# Patient Record
Sex: MALE | Race: NATIVE HAWAIIAN OR OTHER PACIFIC ISLANDER
[De-identification: names, ages, dates, MRNs, and addresses within clinical notes are randomized per-mention and may not be internally consistent; named-entity substitution may affect disease eponyms.]

---

## 2017-09-16 ENCOUNTER — HOSPITAL ENCOUNTER (EMERGENCY)
Dept: HOSPITAL 5 - ED | Age: 47
Discharge: LEFT BEFORE DISCHARGE | End: 2017-09-16
Payer: SELF-PAY

## 2017-09-16 VITALS — DIASTOLIC BLOOD PRESSURE: 105 MMHG | SYSTOLIC BLOOD PRESSURE: 147 MMHG
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
## 2017-09-16 DIAGNOSIS — Z53.21: ICD-10-CM

## 2017-09-16 DIAGNOSIS — S09.90XA: Primary | ICD-10-CM
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
## 2018-09-19 ENCOUNTER — HOSPITAL ENCOUNTER (EMERGENCY)
Dept: HOSPITAL 5 - ED | Age: 48
Discharge: HOME | End: 2018-09-19
Payer: SELF-PAY

## 2018-09-19 VITALS — SYSTOLIC BLOOD PRESSURE: 150 MMHG | DIASTOLIC BLOOD PRESSURE: 99 MMHG

## 2018-09-19 DIAGNOSIS — R07.89: Primary | ICD-10-CM

## 2018-09-19 DIAGNOSIS — F41.1: ICD-10-CM

## 2018-09-19 DIAGNOSIS — R20.0: ICD-10-CM

## 2018-09-19 DIAGNOSIS — F14.10: ICD-10-CM

## 2018-09-19 DIAGNOSIS — F17.200: ICD-10-CM

## 2018-09-19 LAB
BASOPHILS # (AUTO): 0 K/MM3 (ref 0–0.1)
BASOPHILS NFR BLD AUTO: 0.3 % (ref 0–1.8)
BUN SERPL-MCNC: 9 MG/DL (ref 9–20)
BUN/CREAT SERPL: 10 %
CALCIUM SERPL-MCNC: 8.9 MG/DL (ref 8.4–10.2)
EOSINOPHIL # BLD AUTO: 0.1 K/MM3 (ref 0–0.4)
EOSINOPHIL NFR BLD AUTO: 1.3 % (ref 0–4.3)
HCT VFR BLD CALC: 43.3 % (ref 35.5–45.6)
HEMOLYSIS INDEX: 6
HGB BLD-MCNC: 14.7 GM/DL (ref 11.8–15.2)
LYMPHOCYTES # BLD AUTO: 1.6 K/MM3 (ref 1.2–5.4)
LYMPHOCYTES NFR BLD AUTO: 22.5 % (ref 13.4–35)
MCH RBC QN AUTO: 31 PG (ref 28–32)
MCHC RBC AUTO-ENTMCNC: 34 % (ref 32–34)
MCV RBC AUTO: 91 FL (ref 84–94)
MONOCYTES # (AUTO): 0.6 K/MM3 (ref 0–0.8)
MONOCYTES % (AUTO): 8.6 % (ref 0–7.3)
PLATELET # BLD: 233 K/MM3 (ref 140–440)
RBC # BLD AUTO: 4.74 M/MM3 (ref 3.65–5.03)

## 2018-09-19 PROCEDURE — 36415 COLL VENOUS BLD VENIPUNCTURE: CPT

## 2018-09-19 PROCEDURE — 93005 ELECTROCARDIOGRAM TRACING: CPT

## 2018-09-19 PROCEDURE — 80048 BASIC METABOLIC PNL TOTAL CA: CPT

## 2018-09-19 PROCEDURE — 84484 ASSAY OF TROPONIN QUANT: CPT

## 2018-09-19 PROCEDURE — 93010 ELECTROCARDIOGRAM REPORT: CPT

## 2018-09-19 PROCEDURE — 85025 COMPLETE CBC W/AUTO DIFF WBC: CPT

## 2018-09-19 NOTE — EMERGENCY DEPARTMENT REPORT
ED Chest Pain HPI





- General


Chief Complaint: Chest Pain


Stated Complaint: CHEST PAINS


Time Seen by Provider: 09/19/18 18:11


Source: patient


Mode of arrival: Ambulatory


Limitations: No Limitations





- History of Present Illness


Initial Comments: 





Patient is a 47-year-old  male who states he is having some chest 

discomfort and facial numbness earlier today.  Patient states that he believes 

this is secondary to anxiety.  Patient does have a history of anxiety is been 

under a lot of stress lately.  Patient last night O [place] with his wife and 

went to a bar was drinking heavily smoking cigarettes and also snorted a little 

cocaine.  Patient states this is not unusual behavior for him.  Patient 

currently have to taking his anxiety medicines states he feels much improved.  

His chest pain isn't resolved.  The patient does denies any shortness of breath 

nausea vomiting diarrhea fevers or chills at this time.





- Related Data


 Allergies











Allergy/AdvReac Type Severity Reaction Status Date / Time


 


No Known Allergies Allergy   Verified 09/16/17 02:53














Heart Score





- HEART Score


History: Slightly suspicious


EKG: Normal


Age: < 45


Risk factors: 1-2 risk factors


Troponin: < normal limit


HEART Score: 1





ED Review of Systems


ROS: 


Stated complaint: CHEST PAINS


Other details as noted in HPI





Comment: All other systems reviewed and negative





ED Past Medical Hx





- Past Medical History


Hx Pulmonary Embolism: Yes (anxiety)





- Surgical History


Past Surgical History?: No





- Social History


Smoking Status: Current Every Day Smoker


Substance Use Type: Alcohol, Cocaine





ED Physical Exam





- General


Limitations: No Limitations


General appearance: alert, in no apparent distress





- Head


Head exam: Present: atraumatic, normocephalic





- Eye


Eye exam: Present: normal appearance





- ENT


ENT exam: Present: mucous membranes moist





- Neck


Neck exam: Present: normal inspection





- Respiratory


Respiratory exam: Present: normal lung sounds bilaterally.  Absent: respiratory 

distress, wheezes, rales, rhonchi





- Cardiovascular


Cardiovascular Exam: Present: regular rate, normal rhythm.  Absent: systolic 

murmur, diastolic murmur, rubs, gallop





- GI/Abdominal


GI/Abdominal exam: Present: soft, normal bowel sounds.  Absent: distended, 

tenderness, guarding, rebound





- Rectal


Rectal exam: Present: deferred





- Extremities Exam


Extremities exam: Present: normal inspection





- Back Exam


Back exam: Present: normal inspection





- Neurological Exam


Neurological exam: Present: alert, oriented X3





- Psychiatric


Psychiatric exam: Present: normal affect, normal mood





- Skin


Skin exam: Present: warm, dry, intact, normal color.  Absent: rash





ED Course





 Vital Signs











  09/19/18





  16:39


 


Temperature 99 F


 


Pulse Rate 86


 


Respiratory 18





Rate 


 


Blood Pressure 150/99


 


O2 Sat by Pulse 99





Oximetry 














ED Medical Decision Making





- Lab Data


Result diagrams: 


 09/19/18 16:53





 09/19/18 16:53








 Lab Results











  09/19/18 09/19/18 Range/Units





  16:53 16:53 


 


WBC  7.1   (4.5-11.0)  K/mm3


 


RBC  4.74   (3.65-5.03)  M/mm3


 


Hgb  14.7   (11.8-15.2)  gm/dl


 


Hct  43.3   (35.5-45.6)  %


 


MCV  91   (84-94)  fl


 


MCH  31   (28-32)  pg


 


MCHC  34   (32-34)  %


 


RDW  13.5   (13.2-15.2)  %


 


Plt Count  233   (140-440)  K/mm3


 


Lymph % (Auto)  22.5   (13.4-35.0)  %


 


Mono % (Auto)  8.6 H   (0.0-7.3)  %


 


Eos % (Auto)  1.3   (0.0-4.3)  %


 


Baso % (Auto)  0.3   (0.0-1.8)  %


 


Lymph #  1.6   (1.2-5.4)  K/mm3


 


Mono #  0.6   (0.0-0.8)  K/mm3


 


Eos #  0.1   (0.0-0.4)  K/mm3


 


Baso #  0.0   (0.0-0.1)  K/mm3


 


Seg Neutrophils %  67.3   (40.0-70.0)  %


 


Seg Neutrophils #  4.8   (1.8-7.7)  K/mm3


 


Sodium   139  (137-145)  mmol/L


 


Potassium   4.0  (3.6-5.0)  mmol/L


 


Chloride   101.8  ()  mmol/L


 


Carbon Dioxide   25  (22-30)  mmol/L


 


Anion Gap   16  mmol/L


 


BUN   9  (9-20)  mg/dL


 


Creatinine   0.9  (0.8-1.5)  mg/dL


 


Estimated GFR   > 60  ml/min


 


BUN/Creatinine Ratio   10  %


 


Glucose   96  ()  mg/dL


 


Calcium   8.9  (8.4-10.2)  mg/dL


 


Troponin T   < 0.010  (0.00-0.029)  ng/mL














- EKG Data


-: EKG Interpreted by Me





- EKG Data





09/19/18 18:31


EKG shows sinus rhythm rate 81 axis is normal intervals are normal patient has Q

-wave in V2 there is no ST segment elevation or depressions present.  Time of 

interpretation is 1635





- Medical Decision Making





Symptoms have resolved him were most likely secondary to cocaine abuse and 

anxiety.  Patient is to continue with his anxiety meds calcification the 

patient on drug use and patient be discharged home.


Critical care attestation.: 


If time is entered above; I have spent that time in minutes in the direct care 

of this critically ill patient, excluding procedure time.








ED Disposition


Clinical Impression: 


 Atypical chest pain, Cocaine abuse, Anxiety reaction





Disposition: DC-01 TO HOME OR SELFCARE


Is pt being admited?: No


Does the pt Need Aspirin: No


Condition: Stable


Instructions:  Chest Pain (ED)


Referrals: 


PR[facility] CARE,MD [Primary Care Provider] - 3-5 Days


Time of Disposition: 18:34

## 2019-03-11 ENCOUNTER — HOSPITAL ENCOUNTER (EMERGENCY)
Dept: HOSPITAL 5 - ED | Age: 49
Discharge: HOME | End: 2019-03-11
Payer: COMMERCIAL

## 2019-03-11 VITALS — SYSTOLIC BLOOD PRESSURE: 137 MMHG | DIASTOLIC BLOOD PRESSURE: 87 MMHG

## 2019-03-11 DIAGNOSIS — M54.2: Primary | ICD-10-CM

## 2019-03-11 DIAGNOSIS — F17.200: ICD-10-CM

## 2019-03-11 PROCEDURE — 72040 X-RAY EXAM NECK SPINE 2-3 VW: CPT

## 2019-03-11 NOTE — EMERGENCY DEPARTMENT REPORT
Blank Doc





- Documentation


Documentation: 





This is a 48-year-old male that presents with cervical spine pain.  Patient was 

seen by PCP and is requesting for an cervical spine xray to r/o degenerative 

disc disease.





This initial assessment/diagnostic orders/clinical plan/treatment(s) is/are 

subject to change based on patient's health status, clinical progression and re-

assessment by fellow clinical providers in the ED.  Further treatment and workup

at subsequent clinical providers discretion.  Patient/guardians urged not to 

elope from the ED as their condition may be serious if not clinically assessed 

and managed.  Initial orders include:


1- Patient sent to [facility] for further evaluation and treatment


2- xray

## 2019-03-11 NOTE — EMERGENCY DEPARTMENT REPORT
ED Neck Pain [facility]


Chief Complaint: Neck Pain/Injury


Stated Complaint: NECK PAIN


Time Seen by Provider: 19 16:48


Neck Pain Location: Posterior Neck


Severity: moderate


Symptoms: Yes Pain with Movement, Yes Radiation to Right Upper Ext, No Numbness,

No Weakness, No Previous History


Other History: 62  male comes in with a prescription from [facility] to have cervical spine films.  Patient reports pain in his neck 

that radiates to his left upper extremity.  Patient denies any trauma.  Patient 

is currently taking Soma, citalopram, and diclofenac.  Patient reports that 

those medications help with his pain.





ED Review of Systems


ROS: 


Stated complaint: NECK PAIN


Other details as noted in HPI








ED Past Medical Hx





- Past Medical History


Hx Pulmonary Embolism: Yes (anxiety)





- Social History


Smoking Status: Current Every Day Smoker


Substance Use Type: None





Neck Pain Exam





- Exam


General: 


Vital signs noted. No distress. Alert and acting appropriately.





HEENT: No Facial Pain, No Scalp Tenderness, No Contusion, No Abrasion, No 

Laceration


Neck Pain: Yes Midline Tenderness, Yes Right Paraspinal Tenderness, Yes Left 

Paraspinal Tenderness, Yes Right Trapezius Tenderness, Yes Left Trapezius 

Tenderness, Yes Pain with Rotation Left, Yes Pain with Extension, Yes Pain with 

Flexion, Yes pain with R Lateral Flexion, Yes Pain with L Lateral Flexion


Chest: Yes Clear Lung Sounds, No Pain with Respirations


Heart: Yes Regular


Back: No Thoracic Tenderness, No Lumbar Tenderness


Neuro: No Numbness, No Weakness, No Normal Reflexes, No Radicular Deficits





ED Course


                                   Vital Signs











  19





  16:51


 


Temperature 98.1 F


 


Pulse Rate 82


 


Respiratory 18





Rate 


 


Blood Pressure 137/87


 


O2 Sat by Pulse 99





Oximetry 














ED Medical Decision Making





- Radiology Data


Radiology results: report reviewed





Patient: WOJCIECH ESTRELLA MR#: [id number] 


4 


: 1970 Acct:[id number] 





Age/Sex: 48 / M ADM Date: 19 





Loc: ED 


Attending Dr: 








Ordering Physician: TASNEEM RUIZ NP 


Date of Service: 19 


Procedure(s): XR spine cervical 2-3V 


Accession Number(s): [id number] 





cc: TASNEEM RUIZ NP 





Fluoro Time In Minutes: 





PROCEDURE: XR SPINE CERVICAL 2-3V 





TECHNIQUE: AP, lateral , swimmer's, and odontoid views of the cervical spine 





HISTORY: cervical spine . Headache with left-sided weakness 





COMPARISONS: None . 





FINDINGS: 





The vertebral body heights are well maintained. Mild disc space narrowing at C3-

C4 and C5-C6 is 


noted with spurring anteriorly at these levels. The alignment is normal. No 

prevertebral soft tissue


swelling is seen. The odontoid is intact. 





IMPRESSION: No acute abnormality in the cervical spine. Degenerative disc 

changes at C3-C4 and C5-


C6 





This document is electronically signed by Kelly Osorio MD., 2019 

07:15:36 PM ET 





Transcribed By: [facility] 


Dictated By: KELLY OSORIO MD 


Electronically Authenticated By: KELLY OSORIO MD 


Signed Date/Time: 19 











DD/DT: 19 


TD/TT: 19





- Medical Decision Making





Patient has been evaluated by this provider in [facility].


Cervical spine x-rays were performed shows patient has degenerative disc disease

C3 to the 4 and C4 and C5.


This is made to be given to patient to take back to [facility].


Patient is to continue with PAIN medications that being given to him by his 

primary care provider and [facility].


Critical care attestation.: 


If time is entered above; I have spent that time in minutes in the direct care 

of this critically ill patient, excluding procedure time.








ED Disposition


Clinical Impression: 


 Cervical pain (neck)





Disposition: DC-01 TO HOME OR SELFCARE


Is pt being admited?: No


Does the pt Need Aspirin: No


Condition: Stable


Instructions:  Cervical Radiculopathy (ED)


Additional Instructions: 


Please continue with here medications that have been prescribed by her doctors. 

Please take her disks Dr. Bedolla for review.


Referrals[facility] 


[facility] - 3-5 Days


MARLEEN BEDOLLA JR, MD [Referring] - 3-5 Days

## 2019-03-11 NOTE — XRAY REPORT
PROCEDURE: XR SPINE CERVICAL 2-3V 

 

TECHNIQUE:  AP, lateral , swimmer's, and odontoid views of the cervical spine 

 

HISTORY: cervical spine . Headache with left-sided weakness 

 

COMPARISONS: None . 

 

FINDINGS: 

 

The vertebral body heights are well maintained. Mild disc space narrowing at C3-C4 and C5-C6 is noted
 with spurring anteriorly at these levels. The alignment is normal. No prevertebral soft tissue swell
ing is seen. The odontoid is intact. 

 

IMPRESSION: No acute abnormality in the cervical spine. Degenerative disc changes at C3-C4 and C5-C6 


 

This document is electronically signed by Kelly Osorio MD., March 11 2019 07:15:36 PM ET

## 2020-12-06 ENCOUNTER — HOSPITAL ENCOUNTER (EMERGENCY)
Dept: HOSPITAL 5 - ED | Age: 50
LOS: 1 days | Discharge: HOME | End: 2020-12-07
Payer: SELF-PAY

## 2020-12-06 DIAGNOSIS — B35.4: ICD-10-CM

## 2020-12-06 DIAGNOSIS — Z79.899: ICD-10-CM

## 2020-12-06 DIAGNOSIS — G89.29: ICD-10-CM

## 2020-12-06 DIAGNOSIS — M54.41: Primary | ICD-10-CM

## 2020-12-06 DIAGNOSIS — F41.9: ICD-10-CM

## 2020-12-06 LAB
ALBUMIN SERPL-MCNC: 4.3 G/DL (ref 3.9–5)
ALT SERPL-CCNC: 20 UNITS/L (ref 7–56)
BASOPHILS # (AUTO): 0 K/MM3 (ref 0–0.1)
BASOPHILS NFR BLD AUTO: 0.3 % (ref 0–1.8)
BUN SERPL-MCNC: 12 MG/DL (ref 9–20)
BUN/CREAT SERPL: 13 %
CALCIUM SERPL-MCNC: 9.5 MG/DL (ref 8.4–10.2)
EOSINOPHIL # BLD AUTO: 0.1 K/MM3 (ref 0–0.4)
EOSINOPHIL NFR BLD AUTO: 1.1 % (ref 0–4.3)
HCT VFR BLD CALC: 41.2 % (ref 35.5–45.6)
HEMOLYSIS INDEX: 4
HGB BLD-MCNC: 13.9 GM/DL (ref 11.8–15.2)
LYMPHOCYTES # BLD AUTO: 2.1 K/MM3 (ref 1.2–5.4)
LYMPHOCYTES NFR BLD AUTO: 22.6 % (ref 13.4–35)
MCHC RBC AUTO-ENTMCNC: 34 % (ref 32–34)
MCV RBC AUTO: 91 FL (ref 84–94)
MONOCYTES # (AUTO): 0.6 K/MM3 (ref 0–0.8)
MONOCYTES % (AUTO): 6.3 % (ref 0–7.3)
PLATELET # BLD: 244 K/MM3 (ref 140–440)
RBC # BLD AUTO: 4.53 M/MM3 (ref 3.65–5.03)

## 2020-12-06 PROCEDURE — 99284 EMERGENCY DEPT VISIT MOD MDM: CPT

## 2020-12-06 PROCEDURE — 80053 COMPREHEN METABOLIC PANEL: CPT

## 2020-12-06 PROCEDURE — 85025 COMPLETE CBC W/AUTO DIFF WBC: CPT

## 2020-12-06 PROCEDURE — 83690 ASSAY OF LIPASE: CPT

## 2020-12-06 PROCEDURE — 36415 COLL VENOUS BLD VENIPUNCTURE: CPT

## 2020-12-06 PROCEDURE — 74176 CT ABD & PELVIS W/O CONTRAST: CPT

## 2020-12-06 PROCEDURE — 81001 URINALYSIS AUTO W/SCOPE: CPT

## 2020-12-06 PROCEDURE — 96372 THER/PROPH/DIAG INJ SC/IM: CPT

## 2020-12-06 NOTE — CAT SCAN REPORT
CT ABDOMEN AND PELVIS WITHOUT CONTRAST



INDICATION / CLINICAL INFORMATION:

bilateral flank pain.



TECHNIQUE:

Axial CT images were obtained through the abdomen and pelvis without IV contrast.  All CT scans at [facility] location are performed using CT dose reduction for ALARA by means of automated exposure control. 



COMPARISON:

None available.



FINDINGS:

LOWER CHEST: No significant abnormality.

LIVER: No significant abnormality.

GALLBLADDER: No significant abnormality.  

BILE DUCTS: No significant abnormality.

PANCREAS: No significant abnormality.

SPLEEN: No significant abnormality.

ADRENALS: No significant abnormality.

RIGHT KIDNEY and URETER: No significant abnormality.

LEFT KIDNEY and URETER: No significant abnormality.



STOMACH and SMALL BOWEL: No significant abnormality. 

COLON: No significant abnormality. 

APPENDIX: Not definitively seen, but there are no findings to suggest appendicitis.  

PERITONEUM: No free fluid. No free air. No fluid collection.

LYMPH NODES: No significant adenopathy.

AORTA and ARTERIES: No significant abnormality. 

IVC and VEINS: No significant abnormality.



URINARY BLADDER: No significant abnormality.

REPRODUCTIVE ORGANS: No significant abnormality.



ADDITIONAL FINDINGS: None.



SKELETAL SYSTEM: No significant abnormality.



IMPRESSION:

1. No acute process identified to account for patient's flank pain. There is no hydronephrosis or nep
hrolithiasis.



Signer Name: Tyesha Lorenzana MD 

Signed: 12/6/2020 11:18 PM

Workstation Name: [id number]

## 2020-12-06 NOTE — EVENT NOTE
ED Screening Note


ED Screening Note: 





states he had blood in stool two episodes 


abd pain began 3-4 days ago 


no n/v/d


straining to have a BM


no fever 


no dysuria, no urinary symptoms


pmhx PUD


allergy: none 


+ETOH, once a week 


+smoker, 2 packs a week 








This initial assessment/diagnostic orders/clinical plan/treatment(s) is/are 

subject to change based on patients health status, clinical progression and re-

assessment by fellow clinical providers in the ED. Further treatment and workup 

at subsequent clinical providers discretion. Patient/guardian urged not to elope

from the ED as their condition may be serious if not clinically assessed and 

managed. 





Initial orders include: 


labs

## 2020-12-07 VITALS — SYSTOLIC BLOOD PRESSURE: 137 MMHG | DIASTOLIC BLOOD PRESSURE: 88 MMHG

## 2020-12-07 LAB
BILIRUB UR QL STRIP: (no result)
BLOOD UR QL VISUAL: (no result)
PH UR STRIP: 6 [PH] (ref 5–7)
PROT UR STRIP-MCNC: (no result) MG/DL
RBC #/AREA URNS HPF: 0 /HPF (ref 0–6)
UROBILINOGEN UR-MCNC: < 2 MG/DL (ref ?–2)
WBC #/AREA URNS HPF: 0 /HPF (ref 0–6)

## 2020-12-07 NOTE — EMERGENCY DEPARTMENT REPORT
ED Back Pain/Injury HPI





- General


Chief Complaint: Abdominal Pain


Stated Complaint: ABDOMINAL PAIN, BLOOD IN STOOL


Time Seen by Provider: 20 20:46


Source: patient


Limitations: No Limitations





- History of Present Illness


Initial Comments: 





Patient is a 50-year-old  male with history of chronic low back pain, PE

and anxiety who presents to the ED with complaint of acute exacerbation of his 

chronic low back pain that radiates to the right leg and lower abdomen persi

stently for the last 1 week, worse in the last 2 days.  Patient states that the 

pain is persistent and constant despite taking over-the-counter pain 

medications.  Patient also complains of mild dry scaly erythematous 

maculopapular rashes diffusely with itching for the last 1 week.  Patient states

that the pain in the lower back gets worse with ambulation or any movement.  

Patient denies fever, chills, dysuria, urinary frequency and urgency, nausea, 

vomiting, hematuria, testicular pain, traumatic injury, heavy lifting, numbness 

and tingling or weakness of lower extremities bilaterally, urinary or bowel 

incontinence and saddle paresthesia and diarrhea.


MD Complaint: back pain, other (diffuse rashes; bilateral lower abdominal pain)


-: Sudden, week(s) (1)


Similar Symptoms Previously: Yes


Place: home


Radiation: abdomen (lower abdomen)


Severity: severe


Severity scale (0 -10): 7


Quality: sharp, aching


Consistency: constant


Improves With: none


Worsens With: movement, walking


Context: unknown


Associated Symptoms: denies other symptoms, abdominal pain (diffuse lower ab

domen).  denies: confusion, weakness, chest pain, numbness, difficulty walking, 

cough, difficulty urinating, diaphoresis, incontinence, constipation, headaches,

nausea/vomiting, rash, seizure, shortness of breath, other


Treatments Prior to Arrival: NSAIDS





- Related Data


                                  Previous Rx's











 Medication  Instructions  Recorded  Last Taken  Type


 


Ibuprofen [Motrin] 800 mg PO Q8HR PRN #30 tablet 19 Unknown Rx


 


predniSONE [Deltasone] 20 mg PO DAILY #5 tablet 19 Unknown Rx


 


Ibuprofen [Motrin] 800 mg PO Q8HR PRN #30 tablet 20 Unknown Rx


 


Terbinafine (Nf) [LamiSIL] 250 mg PO QDAY #14 tablet 20 Unknown Rx


 


methOCARBAMOL [Robaxin TAB] 750 mg PO Q12H PRN #30 tab 12/07/20 Unknown Rx


 


predniSONE [Deltasone] 60 mg PO QDAY #15 tab 20 Unknown Rx











                                    Allergies











Allergy/AdvReac Type Severity Reaction Status Date / Time


 


No Known Allergies Allergy   Verified 17 02:53














ED Review of Systems


ROS: 


Stated complaint: ABDOMINAL PAIN, BLOOD IN STOOL


Other details as noted in HPI





Constitutional: denies: chills, fever


Eyes: denies: eye pain, eye discharge, vision change


ENT: denies: ear pain, throat pain


Respiratory: denies: cough, shortness of breath, wheezing


Cardiovascular: denies: chest pain, palpitations


Endocrine: no symptoms reported


Gastrointestinal: abdominal pain (Diffuse lower abdominal pain radiating from 

the low back).  denies: nausea, vomiting, diarrhea


Genitourinary: denies: urgency, dysuria


Musculoskeletal: back pain (Low back pain), arthralgia (Right leg pain).  

denies: joint swelling


Skin: denies: rash, lesions


Neurological: denies: headache, weakness, paresthesias


Psychiatric: denies: anxiety, depression


Hematological/Lymphatic: denies: easy bleeding, easy bruising





ED Past Medical Hx





- Past Medical History


Previous Medical History?: Yes


Hx Pulmonary Embolism: Yes (anxiety)


Additional medical history: CHRONIC SPINE PAIN





- Social History


Smoking Status: Never Smoker


Substance Use Type: None





- Medications


Home Medications: 


                                Home Medications











 Medication  Instructions  Recorded  Confirmed  Last Taken  Type


 


Ibuprofen [Motrin] 800 mg PO Q8HR PRN #30 tablet 19  Unknown Rx


 


predniSONE [Deltasone] 20 mg PO DAILY #5 tablet 19  Unknown Rx


 


Ibuprofen [Motrin] 800 mg PO Q8HR PRN #30 tablet 20  Unknown Rx


 


Terbinafine (Nf) [LamiSIL] 250 mg PO QDAY #14 tablet 20  Unknown Rx


 


methOCARBAMOL [Robaxin TAB] 750 mg PO Q12H PRN #30 tab 20  Unknown Rx


 


predniSONE [Deltasone] 60 mg PO QDAY #15 tab 20  Unknown Rx














ED Physical Exam





- General


Limitations: No Limitations


General appearance: alert, in no apparent distress





- Head


Head exam: Present: atraumatic, normocephalic, normal inspection





- Eye


Eye exam: Present: normal appearance, PERRL, EOMI


Pupils: Present: normal accommodation





- ENT


ENT exam: Present: normal exam, normal orophraynx, mucous membranes moist, TM's 

normal bilaterally, normal external ear exam





- Neck


Neck exam: Present: normal inspection, full ROM





- Respiratory


Respiratory exam: Present: normal lung sounds bilaterally.  Absent: respiratory 

distress, wheezes, rhonchi, chest wall tenderness, accessory muscle use, 

decreased breath sounds, prolonged expiratory





- Cardiovascular


Cardiovascular Exam: Present: regular rate, normal rhythm, normal heart sounds. 

 Absent: systolic murmur, diastolic murmur, rubs, gallop





- GI/Abdominal


GI/Abdominal exam: Present: soft, normal bowel sounds.  Absent: tenderness, 

guarding, rebound, hyperactive bowel sounds, hypoactive bowel sounds, 

organomegaly





- Extremities Exam


Extremities exam: Present: normal inspection, full ROM, normal capillary refill





- Back Exam


Back exam: Present: normal inspection, full ROM, tenderness (Palpable 

lumbosacral paraspinal musculoskeletal tenderness), muscle spasm, paraspinal 

tenderness.  Absent: CVA tenderness (L)





- Neurological Exam


Neurological exam: Present: alert, oriented X3, CN II-XII intact, normal gait, 

reflexes normal





- Psychiatric


Psychiatric exam: Present: normal affect, normal mood





- Skin


Skin exam: Present: warm, dry, intact, normal color, erythema (Dry scaly mildly 

erythematous rashes diffusely).  Absent: rash





ED Course





                                   Vital Signs











  20





  20:18


 


Temperature 98.6 F


 


Pulse Rate 89


 


Respiratory 18





Rate 


 


Blood Pressure 151/91


 


O2 Sat by Pulse 99





Oximetry 














ED Medical Decision Making





- Lab Data


Result diagrams: 


                                 20 20:51





                                 20 20:51





- Radiology Data


Radiology results: report reviewed, image reviewed





Findings





[address] [address] 





Cat Scan Report 


Signed 





 Patient: WOJCIECH ESTRELLA MR#: [id number] 


 4 


 : 1970 Acct:[id number] 





 Age/Sex: 50 / M ADM Date: 20 





 Loc: ED 


 Attending Dr: 








 Ordering Physician: JAYDA HARRISON 


 Date of Service: 20 


 Procedure(s): CT abdomen pelvis wo con 


 Accession Number(s): [id number] 





 cc: JAYDA HARRISON 














CT ABDOMEN AND PELVIS WITHOUT CONTRAST 





INDICATION / CLINICAL INFORMATION: 


bilateral flank pain. 





TECHNIQUE: 


Axial CT images were obtained through the abdomen and pelvis without IV 

contrast. All CT scans at 


 this location are performed using CT dose reduction for ALARA by means of 

automated exposure 


 control. 





COMPARISON: 


None available. 





FINDINGS: 


LOWER CHEST: No significant abnormality. 


LIVER: No significant abnormality. 


GALLBLADDER: No significant abnormality. 


BILE DUCTS: No significant abnormality. 


PANCREAS: No significant abnormality. 


SPLEEN: No significant abnormality. 


ADRENALS: No significant abnormality. 


RIGHT KIDNEY and URETER: No significant abnormality. 


LEFT KIDNEY and URETER: No significant abnormality. 





STOMACH and SMALL BOWEL: No significant abnormality. 


COLON: No significant abnormality. 


APPENDIX: Not definitively seen, but there are no findings to suggest 

appendicitis. 


PERITONEUM: No free fluid. No free air. No fluid collection. 


LYMPH NODES: No significant adenopathy. 


AORTA and ARTERIES: No significant abnormality. 


IVC and VEINS: No significant abnormality. 





URINARY BLADDER: No significant abnormality. 


REPRODUCTIVE ORGANS: No significant abnormality. 





ADDITIONAL FINDINGS: None. 





SKELETAL SYSTEM: No significant abnormality. 





IMPRESSION: 


1. No acute process identified to account for patient's flank pain. There is no 

hydronephrosis or 


 nephrolithiasis. 





Signer Name: Tyesha Lorenzana MD 


Signed: 2020 11:18 PM 


Workstation Name: [id number] 








 Transcribed By: [facility] 


 Dictated By: Tyesha Lorenzana MD 


 Electronically Authenticated By: Tyesha Lorenzana MD 


 Signed Date/Time: 20 











 DD/DT: 20 


 TD/TT: 





- Medical Decision Making





This is a 50-year-old  male with history of chronic low back pain, PE 

and anxiety who presents to the ED with complaint of acute exacerbation of his 

chronic low back pain that radiates to the right leg and lower abdomen 

persistently for the last 1 week, worse in the last 2 days.  Patient states that

 the pain is persistent and constant despite taking over-the-counter pain 

medications.  Patient also complains of mild dry scaly erythematous maculop

apular rashes diffusely with itching for the last 1 week.  Patient states that 

the pain in the lower back gets worse with ambulation or any movement.  In the 

ED, patient is alert and oriented x3 and is not in distress.  Patient was 

treated for pain in the ED and lab test results were reviewed and are all 

nonactionable including urinalysis.  Abdomen pelvis CT scan without contrast 

showed no acute abnormalities.  On reevaluation, patient's pain is well 

controlled medications.  Patient will discharge home on pain medications and 

muscle relaxants and was advised to follow-up with his primary care physician in

 7 to 10 days for reevaluation or return to the ED immediately if symptoms get 

worse.





- Differential Diagnosis


Muscle spasm; muscle strain; kidney stones; constipation; sciatica; UTI


Critical care attestation.: 


If time is entered above; I have spent that time in minutes in the direct care 

of this critically ill patient, excluding procedure time.








ED Disposition


Clinical Impression: 


 Acute exacerbation of chronic low back pain, Tinea corporis





Chronic low back pain with right-sided sciatica


Qualifiers:


 Back pain laterality: right Qualified Code(s): M54.41 - Lumbago with sciatica, 

right side; G89.29 - Other chronic pain





Disposition:  TO HOME OR SELFCARE


Is pt being admited?: No


Does the pt Need Aspirin: No


Condition: Stable


Instructions:  Chronic Back Pain, Easy-to-Read, Sciatica, Easy-to-Read, Body 

Ringworm, Muscle Cramps and Spasms, Easy-to-Read


Additional Instructions: 


All lab test results are unremarkable.  Abdomen pelvis CT scan without contrast 

shows no acute abnormalities.  Therefore take medications with food, drink 

plenty of fluids and follow-up with your primary care physician in 7 to 10 days 

for reevaluation.  Return to the ED immediately if symptoms get worse.


Prescriptions: 


predniSONE [Deltasone] 60 mg PO QDAY #15 tab


Terbinafine (Nf) [LamiSIL] 250 mg PO QDAY #14 tablet


Ibuprofen [Motrin] 800 mg PO Q8HR PRN #30 tablet


 PRN Reason: Pain , Severe (7-10)


methOCARBAMOL [Robaxin TAB] 750 mg PO Q12H PRN #30 tab


 PRN Reason: Muscle Spasm


Referrals: 


[facility] [Provider Group] - 7-10 days


Time of Disposition: 00:56


Print Language: ENGLISH

## 2022-09-14 ENCOUNTER — HOSPITAL ENCOUNTER (EMERGENCY)
Dept: HOSPITAL 5 - ED | Age: 52
LOS: 1 days | Discharge: HOME | End: 2022-09-15
Payer: SELF-PAY

## 2022-09-14 VITALS — SYSTOLIC BLOOD PRESSURE: 167 MMHG | DIASTOLIC BLOOD PRESSURE: 88 MMHG

## 2022-09-14 DIAGNOSIS — N50.811: ICD-10-CM

## 2022-09-14 DIAGNOSIS — R30.0: Primary | ICD-10-CM

## 2022-09-14 LAB
BASOPHILS # (AUTO): 0 K/MM3 (ref 0–0.1)
BASOPHILS NFR BLD AUTO: 0.2 % (ref 0–1.8)
BUN SERPL-MCNC: 7 MG/DL (ref 9–20)
BUN/CREAT SERPL: 9 %
CALCIUM SERPL-MCNC: 9.3 MG/DL (ref 8.4–10.2)
EOSINOPHIL # BLD AUTO: 0.2 K/MM3 (ref 0–0.4)
EOSINOPHIL NFR BLD AUTO: 2.1 % (ref 0–4.3)
HCT VFR BLD CALC: 43.2 % (ref 35.5–45.6)
HEMOLYSIS INDEX: 7
HGB BLD-MCNC: 14.5 GM/DL (ref 11.8–15.2)
LYMPHOCYTES # BLD AUTO: 2.1 K/MM3 (ref 1.2–5.4)
LYMPHOCYTES NFR BLD AUTO: 27 % (ref 13.4–35)
MCHC RBC AUTO-ENTMCNC: 34 % (ref 32–34)
MCV RBC AUTO: 92 FL (ref 84–94)
MONOCYTES # (AUTO): 0.5 K/MM3 (ref 0–0.8)
MONOCYTES % (AUTO): 6.6 % (ref 0–7.3)
MUCOUS THREADS #/AREA URNS HPF: (no result) /HPF
PLATELET # BLD: 227 K/MM3 (ref 140–440)
RBC # BLD AUTO: 4.67 M/MM3 (ref 3.65–5.03)
RBC #/AREA URNS HPF: < 1 /HPF (ref 0–6)
WBC #/AREA URNS HPF: 1 /HPF (ref 0–6)

## 2022-09-14 PROCEDURE — 36415 COLL VENOUS BLD VENIPUNCTURE: CPT

## 2022-09-14 PROCEDURE — 81001 URINALYSIS AUTO W/SCOPE: CPT

## 2022-09-14 PROCEDURE — 85025 COMPLETE CBC W/AUTO DIFF WBC: CPT

## 2022-09-14 PROCEDURE — 93975 VASCULAR STUDY: CPT

## 2022-09-14 PROCEDURE — 80048 BASIC METABOLIC PNL TOTAL CA: CPT

## 2022-09-14 PROCEDURE — 99284 EMERGENCY DEPT VISIT MOD MDM: CPT

## 2022-09-14 NOTE — ULTRASOUND REPORT
Scrotal Ultrasound



HISTORY: testicle pain.



TECHNIQUE:  Grayscale and color  imaging performed.



COMPARISON:  CT abdomen/pelvis from 12/6/2020



FINDINGS: 



Right testicle measures 4.3 x 2.0 x 3.0 cm with normal appearance and preserved blood flow. Epididymi
s is normal. No hydrocele.



Left testicle measures 4.1 x 2.1 x 3.1 cm with normal appearance and preserved blood flow. Epididymis
 is normal. No significant hydrocele.





IMPRESSION: Unremarkable exam.



Signer Name: Bryan Moody MD 

Signed: 9/14/2022 2:13 PM

Workstation Name: [id number]

## 2022-09-14 NOTE — EMERGENCY DEPARTMENT REPORT
ED Male  HPI





- General


Chief complaint: Medical Clearance


Stated complaint: TESTICULAR PAIN


Time Seen by Provider: 22 21:02


Source: patient


Mode of arrival: Ambulatory


Limitations: No Limitations





- History of Present Illness


Initial comments: 





51-year-old male presents emerged department complaining of a 1 day history of 

lower back pain associated with increased urination and shooting pain that 

radiates down towards the right testicle of unknown etiology reports no no 

traumatic events.  No burning with urination no testicular swelling or rash.  

Reports no fever, chills, sweats.  No hemoptysis no hematemesis hematochezia.  

No suspicion of STD


-: Gradual


Location: right testicle


Radiation: none


Severity: mild


Quality: aching, dull


Consistency: constant


denies other symptoms.  denies: discharge, mass, urinary retention, blood in 

urine, nausea/vomiting, incontinence





- Related Data


Sexually active: Yes


                                  Previous Rx's











 Medication  Instructions  Recorded  Last Taken  Type


 


Ibuprofen [Motrin] 800 mg PO Q8HR PRN #30 tablet 19 Unknown Rx


 


predniSONE [Deltasone] 20 mg PO DAILY #5 tablet 19 Unknown Rx


 


Ibuprofen [Motrin] 800 mg PO Q8HR PRN #30 tablet 20 Unknown Rx


 


Terbinafine (Nf) [LamiSIL] 250 mg PO QDAY #14 tablet 20 Unknown Rx


 


methOCARBAMOL [Robaxin TAB] 750 mg PO Q12H PRN #30 tab 20 Unknown Rx


 


predniSONE [Deltasone] 60 mg PO QDAY #15 tab 20 Unknown Rx


 


Doxycycline Hyclate [Doxycycline 100 mg PO Q12HR #14 tab 22 Unknown Rx





Hyclate TAB]    


 


Ketorolac [Toradol] 10 mg PO Q6H PRN #10 22 Unknown Rx











                                    Allergies











Allergy/AdvReac Type Severity Reaction Status Date / Time


 


No Known Allergies Allergy   Verified 22 12:55














ED Review of Systems


ROS: 


Stated complaint: TESTICULAR PAIN


Other details as noted in HPI





Comment: All other systems reviewed and negative





ED Past Medical Hx





- Past Medical History


Hx Pulmonary Embolism: Yes (anxiety)


Additional medical history: CHRONIC SPINE PAIN





- Social History


Smoking Status: Never Smoker


Substance Use Type: None





- Medications


Home Medications: 


                                Home Medications











 Medication  Instructions  Recorded  Confirmed  Last Taken  Type


 


Ibuprofen [Motrin] 800 mg PO Q8HR PRN #30 tablet 19  Unknown Rx


 


predniSONE [Deltasone] 20 mg PO DAILY #5 tablet 19  Unknown Rx


 


Ibuprofen [Motrin] 800 mg PO Q8HR PRN #30 tablet 20  Unknown Rx


 


Terbinafine (Nf) [LamiSIL] 250 mg PO QDAY #14 tablet 20  Unknown Rx


 


methOCARBAMOL [Robaxin TAB] 750 mg PO Q12H PRN #30 tab 20  Unknown Rx


 


predniSONE [Deltasone] 60 mg PO QDAY #15 tab 20  Unknown Rx


 


Doxycycline Hyclate [Doxycycline 100 mg PO Q12HR #14 tab 22  Unknown Rx





Hyclate TAB]     


 


Ketorolac [Toradol] 10 mg PO Q6H PRN #10 22  Unknown Rx














ED Physical Exam





- General


Limitations: No Limitations


General appearance: alert, in no apparent distress





- Head


Head exam: Present: atraumatic, normocephalic





- Eye


Eye exam: Present: normal appearance, PERRL, EOMI





- ENT


ENT exam: Present: normal exam, normal orophraynx, mucous membranes moist, TM's 

normal bilaterally





- Neck


Neck exam: Present: normal inspection, full ROM





- Respiratory


Respiratory exam: Present: normal lung sounds bilaterally.  Absent: respiratory 

distress, wheezes, rales, chest wall tenderness, accessory muscle use





- Cardiovascular


Cardiovascular Exam: Present: regular rate, normal rhythm.  Absent: systolic 

murmur, diastolic murmur, rubs, gallop





- GI/Abdominal


GI/Abdominal exam: Present: soft, normal bowel sounds





- Rectal


Rectal exam: Present: deferred





- Extremities Exam


Extremities exam: Present: normal inspection





- Back Exam


Back exam: Present: normal inspection





- Neurological Exam


Neurological exam: Present: alert, oriented X3





- Psychiatric


Psychiatric exam: Present: normal affect, normal mood





- Skin


Skin exam: Present: warm, dry, intact, normal color.  Absent: rash





ED Course





                                   Vital Signs











  22





  12:53


 


Temperature 98.8 F


 


Pulse Rate 85


 


Respiratory 18





Rate 


 


Blood Pressure 167/88





[Left] 


 


O2 Sat by Pulse 99





Oximetry 














ED Medical Decision Making





- Lab Data


Result diagrams: 


                                 22 18:48





                                 22 20:12








                                   Lab Results











  22 Range/Units





  18:48 20:12 21:26 


 


WBC  7.6    (4.5-11.0)  K/mm3


 


RBC  4.67    (3.65-5.03)  M/mm3


 


Hgb  14.5    (11.8-15.2)  gm/dl


 


Hct  43.2    (35.5-45.6)  %


 


MCV  92    (84-94)  fl


 


MCH  31    (28-32)  pg


 


MCHC  34    (32-34)  %


 


RDW  13.5    (13.2-15.2)  %


 


Plt Count  227    (140-440)  K/mm3


 


Lymph % (Auto)  27.0    (13.4-35.0)  %


 


Mono % (Auto)  6.6    (0.0-7.3)  %


 


Eos % (Auto)  2.1    (0.0-4.3)  %


 


Baso % (Auto)  0.2    (0.0-1.8)  %


 


Lymph # (Auto)  2.1    (1.2-5.4)  K/mm3


 


Mono # (Auto)  0.5    (0.0-0.8)  K/mm3


 


Eos # (Auto)  0.2    (0.0-0.4)  K/mm3


 


Baso # (Auto)  0.0    (0.0-0.1)  K/mm3


 


Seg Neutrophils %  64.1    (40.0-70.0)  %


 


Seg Neutrophils #  4.9    (1.8-7.7)  K/mm3


 


Sodium   142   (137-145)  mmol/L


 


Potassium   4.1   (3.6-5.0)  mmol/L


 


Chloride   104.3   ()  mmol/L


 


Carbon Dioxide   25   (22-30)  mmol/L


 


Anion Gap   17   mmol/L


 


BUN   7 L   (9-20)  mg/dL


 


Creatinine   0.8   (0.8-1.3)  mg/dL


 


Estimated GFR   > 60   ml/min


 


BUN/Creatinine Ratio   9   %


 


Glucose   84   ()  mg/dL


 


Calcium   9.3   (8.4-10.2)  mg/dL


 


Urine Color    Yellow  (Yellow)  


 


Urine Turbidity    Clear  (Clear)  


 


Specific Gravity (Man)    1.015  (1.003-1.030)  


 


Ur Protein (Man)    Negative  (Negative)  mg/dL


 


Ur Ketones (Man)    Negative  (Negative)  


 


Ur Nitrite (Man)    Negative  (Negative)  


 


Ur Reducing Substances    Not Reportable  


 


Urine Bilirubin (Man)    Negative  (Negative)  


 


Urine Ictotest    Not Reportable  


 


Leukocyte Esterase (Man)    Negative  (Negative)  


 


Urine WBC (Auto)    1.0  (0.0-6.0)  /HPF


 


Urine RBC (Auto)    < 1.0  (0.0-6.0)  /HPF


 


U Epithel Cells (Auto)    < 1.0  (0-13.0)  /HPF


 


Urine RBC (Manual)    Negative  (Negative)  


 


Urine Mucus    Few  /HPF














- Radiology Data


Radiology results: report reviewed





[facility]  


                                     [address] [place] [address]  


 


                                         Ultrasound Report   


                                               Signed  


 


Patient: WOJCIECH ESTRELLA                                                          

      MR#: [id number]  


4          


: 1970                                                                

Acct:[id number]      


 


Age/Sex: 51 / M                                                                

ADM Date: 22     


 


Loc: ED       


Attending Dr:   


 


 


Ordering Physician: JOSE SAMPSON  


Date of Service: 22  


Procedure(s): US testicular doppler comp  


Accession Number(s): [id number]  


 


cc: JOSE SAMPSON   


 


 


Scrotal Ultrasound  


 


 HISTORY: testicle pain.  


 


 TECHNIQUE:  Grayscale and color  imaging performed.  


 


 COMPARISON:  CT abdomen/pelvis from 2020  


 


 FINDINGS:   


 


 Right testicle measures 4.3 x 2.0 x 3.0 cm with normal appearance and preserved

 blood flow. 


Epididymis is normal. No hydrocele.  


 


 Left testicle measures 4.1 x 2.1 x 3.1 cm with normal appearance and preserved 

blood flow. 


Epididymis is normal. No significant hydrocele.  


 


 


 IMPRESSION: Unremarkable exam.  


 


 Signer Name: Bryan Moody MD   


 Signed: 2022 2:13 PM  


 Workstation Name: [id number]   


 


 


Transcribed By: ALEXIS  


Dictated By: Bryan Moody MD  


Electronically Authenticated By: Bryan Moody MD    


Signed Date/Time: 22 1413                                


 


 


 


DD/DT: 22 141                                                            

  


TD/TT:


Critical care attestation.: 


If time is entered above; I have spent that time in minutes in the direct care 

of this critically ill patient, excluding procedure time.








ED Disposition


Clinical Impression: 


 Dysuria, Testicular pain, right





Disposition: 01 HOME / SELF CARE / HOMELESS


Is pt being admited?: No


Does the pt Need Aspirin: No


Condition: Stable


Instructions:  Dysuria, Testicular Self-Exam, Urodynamic Testing, Easy-to-Read, 

Pain Without a Known Cause


Prescriptions: 


Doxycycline Hyclate [Doxycycline Hyclate TAB] 100 mg PO Q12HR #14 tab


Ketorolac [Toradol] 10 mg PO Q6H PRN #10


 PRN Reason: Pain


Referrals: 


PRIMARY CARE,MD [Primary Care Provider] - 2-3 Days